# Patient Record
Sex: FEMALE | Race: OTHER | Employment: OTHER | URBAN - METROPOLITAN AREA
[De-identification: names, ages, dates, MRNs, and addresses within clinical notes are randomized per-mention and may not be internally consistent; named-entity substitution may affect disease eponyms.]

---

## 2017-01-09 ENCOUNTER — HOSPITAL ENCOUNTER (OUTPATIENT)
Dept: PHARMACY | Age: 77
Discharge: HOME | End: 2017-01-09
Payer: MEDICARE

## 2017-01-09 DIAGNOSIS — I48.91 ATRIAL FIBRILLATION, UNSPECIFIED TYPE (HCC): ICD-10-CM

## 2017-01-09 LAB
INR BLD: 2.6
PROTIME: 30.6 SECONDS

## 2017-01-09 PROCEDURE — 85610 PROTHROMBIN TIME: CPT | Performed by: PHARMACIST

## 2017-01-09 PROCEDURE — G0463 HOSPITAL OUTPT CLINIC VISIT: HCPCS | Performed by: PHARMACIST

## 2017-01-30 ENCOUNTER — HOSPITAL ENCOUNTER (OUTPATIENT)
Dept: PHARMACY | Age: 77
Discharge: HOME | End: 2017-01-30
Payer: MEDICARE

## 2017-01-30 DIAGNOSIS — I48.91 ATRIAL FIBRILLATION, UNSPECIFIED TYPE (HCC): ICD-10-CM

## 2017-01-30 LAB
INR BLD: 2.1
PROTIME: 25.4 SECONDS

## 2017-01-30 PROCEDURE — G0463 HOSPITAL OUTPT CLINIC VISIT: HCPCS | Performed by: PHARMACIST

## 2017-01-30 PROCEDURE — 85610 PROTHROMBIN TIME: CPT | Performed by: PHARMACIST

## 2017-02-24 ENCOUNTER — HOSPITAL ENCOUNTER (OUTPATIENT)
Dept: CARDIOLOGY | Age: 77
Discharge: HOME | End: 2017-02-24
Payer: MEDICARE

## 2017-02-24 PROCEDURE — 93279 PRGRMG DEV EVAL PM/LDLS PM: CPT

## 2017-02-27 ENCOUNTER — HOSPITAL ENCOUNTER (OUTPATIENT)
Dept: PHARMACY | Age: 77
Discharge: HOME | End: 2017-02-27
Payer: MEDICARE

## 2017-02-27 DIAGNOSIS — I48.91 ATRIAL FIBRILLATION, UNSPECIFIED TYPE (HCC): ICD-10-CM

## 2017-02-27 LAB
INR BLD: 1.6
PROTIME: 19.1 SECONDS

## 2017-02-27 PROCEDURE — G0463 HOSPITAL OUTPT CLINIC VISIT: HCPCS

## 2017-02-27 PROCEDURE — 85610 PROTHROMBIN TIME: CPT

## 2017-03-13 ENCOUNTER — HOSPITAL ENCOUNTER (OUTPATIENT)
Dept: PHARMACY | Age: 77
Discharge: HOME | End: 2017-03-13
Payer: MEDICARE

## 2017-03-13 DIAGNOSIS — I48.91 ATRIAL FIBRILLATION, UNSPECIFIED TYPE (HCC): ICD-10-CM

## 2017-03-13 LAB
INR BLD: 2.3
PROTIME: 27.2 SECONDS

## 2017-03-13 PROCEDURE — 85610 PROTHROMBIN TIME: CPT | Performed by: PHARMACIST

## 2017-03-13 PROCEDURE — G0463 HOSPITAL OUTPT CLINIC VISIT: HCPCS | Performed by: PHARMACIST

## 2017-03-13 RX ORDER — NITROGLYCERIN 0.4 MG/1
0.4 TABLET SUBLINGUAL PRN
COMMUNITY

## 2017-03-13 RX ORDER — SERTRALINE HYDROCHLORIDE 100 MG/1
200 TABLET, FILM COATED ORAL DAILY
COMMUNITY
Start: 2017-01-31

## 2017-03-13 RX ORDER — FUROSEMIDE 20 MG/1
20 TABLET ORAL DAILY
COMMUNITY
Start: 2016-01-11

## 2017-03-13 RX ORDER — POTASSIUM CHLORIDE 20 MEQ/1
20 TABLET, EXTENDED RELEASE ORAL DAILY
COMMUNITY
Start: 2016-11-25

## 2017-03-13 RX ORDER — HYDROCODONE BITARTRATE AND ACETAMINOPHEN 5; 325 MG/1; MG/1
2 TABLET ORAL EVERY 4 HOURS PRN
COMMUNITY
Start: 2016-11-18 | End: 2019-09-16 | Stop reason: ALTCHOICE

## 2017-03-13 RX ORDER — VALSARTAN 40 MG/1
40 TABLET ORAL DAILY
COMMUNITY
Start: 2017-02-13

## 2017-03-13 RX ORDER — ASPIRIN 81 MG/1
81 TABLET ORAL DAILY
COMMUNITY
Start: 2016-07-08

## 2017-03-13 RX ORDER — ISOSORBIDE MONONITRATE 30 MG/1
30 TABLET, EXTENDED RELEASE ORAL DAILY
COMMUNITY
Start: 2016-01-11

## 2017-03-13 RX ORDER — LANOLIN ALCOHOL/MO/W.PET/CERES
2 CREAM (GRAM) TOPICAL DAILY
COMMUNITY
Start: 2011-03-17

## 2017-03-13 RX ORDER — OMEPRAZOLE 20 MG/1
20 TABLET, DELAYED RELEASE ORAL DAILY
COMMUNITY
Start: 2012-07-06

## 2017-03-13 RX ORDER — LEVOTHYROXINE SODIUM 137 UG/1
137 TABLET ORAL DAILY
COMMUNITY
Start: 2017-02-15

## 2017-03-13 RX ORDER — HYDROCHLOROTHIAZIDE 12.5 MG/1
12.5 CAPSULE, GELATIN COATED ORAL DAILY
COMMUNITY
Start: 2017-02-13

## 2017-03-13 RX ORDER — FERROUS SULFATE 325(65) MG
1 TABLET ORAL 2 TIMES DAILY
COMMUNITY
Start: 2016-06-29

## 2017-03-13 RX ORDER — ATORVASTATIN CALCIUM 40 MG/1
40 TABLET, FILM COATED ORAL NIGHTLY
COMMUNITY
Start: 2015-04-14

## 2017-03-13 RX ORDER — ALBUTEROL SULFATE 90 UG/1
2 AEROSOL, METERED RESPIRATORY (INHALATION) 4 TIMES DAILY
COMMUNITY

## 2017-04-03 ENCOUNTER — HOSPITAL ENCOUNTER (OUTPATIENT)
Dept: PHARMACY | Age: 77
Discharge: HOME | End: 2017-04-03
Payer: MEDICARE

## 2017-04-03 DIAGNOSIS — I48.91 ATRIAL FIBRILLATION, UNSPECIFIED TYPE (HCC): ICD-10-CM

## 2017-04-03 LAB
INR BLD: 1.9
PROTIME: 22.3 SECONDS

## 2017-04-03 PROCEDURE — 85610 PROTHROMBIN TIME: CPT

## 2017-04-03 PROCEDURE — G0463 HOSPITAL OUTPT CLINIC VISIT: HCPCS

## 2017-04-03 RX ORDER — WARFARIN SODIUM 2 MG/1
TABLET ORAL
Qty: 150 TABLET | Refills: 1 | Status: SHIPPED | OUTPATIENT
Start: 2017-04-03 | End: 2019-09-16

## 2017-05-03 ENCOUNTER — ANTI-COAG VISIT (OUTPATIENT)
Dept: PHARMACY | Age: 77
End: 2017-05-03

## 2017-05-03 DIAGNOSIS — I48.91 ATRIAL FIBRILLATION, UNSPECIFIED TYPE (HCC): ICD-10-CM

## 2017-05-30 ENCOUNTER — HOSPITAL ENCOUNTER (OUTPATIENT)
Dept: CARDIOLOGY | Age: 77
Discharge: HOME | End: 2017-05-30
Payer: MEDICARE

## 2017-05-30 PROCEDURE — 93293 PM PHONE R-STRIP DEVICE EVAL: CPT

## 2017-08-29 ENCOUNTER — HOSPITAL ENCOUNTER (OUTPATIENT)
Dept: CARDIOLOGY | Age: 77
Discharge: HOME | End: 2017-08-29
Payer: MEDICARE

## 2017-08-29 PROCEDURE — 93293 PM PHONE R-STRIP DEVICE EVAL: CPT

## 2017-11-30 ENCOUNTER — HOSPITAL ENCOUNTER (OUTPATIENT)
Dept: CARDIOLOGY | Age: 77
Discharge: HOME | End: 2017-11-30
Payer: MEDICARE

## 2017-11-30 PROCEDURE — 93293 PM PHONE R-STRIP DEVICE EVAL: CPT

## 2017-12-21 LAB
ANION GAP SERPL CALCULATED.3IONS-SCNC: 15 MEQ/L (ref 7–13)
BUN BLDV-MCNC: 23 MG/DL (ref 8–23)
CALCIUM SERPL-MCNC: 9.1 MG/DL (ref 8.6–10.2)
CHLORIDE BLD-SCNC: 102 MEQ/L (ref 98–107)
CO2: 28 MEQ/L (ref 22–29)
CREAT SERPL-MCNC: 0.91 MG/DL (ref 0.5–0.9)
GFR AFRICAN AMERICAN: >60
GFR NON-AFRICAN AMERICAN: 59.5
GLUCOSE BLD-MCNC: 82 MG/DL (ref 74–109)
POTASSIUM SERPL-SCNC: 4 MEQ/L (ref 3.5–5.1)
SODIUM BLD-SCNC: 145 MEQ/L (ref 132–144)

## 2017-12-29 ENCOUNTER — HOSPITAL ENCOUNTER (OUTPATIENT)
Dept: CT IMAGING | Age: 77
Discharge: HOME | End: 2017-12-29
Payer: MEDICARE

## 2017-12-29 VITALS
HEIGHT: 59 IN | DIASTOLIC BLOOD PRESSURE: 65 MMHG | WEIGHT: 170 LBS | BODY MASS INDEX: 34.27 KG/M2 | SYSTOLIC BLOOD PRESSURE: 138 MMHG

## 2017-12-29 DIAGNOSIS — R22.2 MASS IN CHEST: ICD-10-CM

## 2017-12-29 PROCEDURE — 73201 CT UPPER EXTREMITY W/DYE: CPT

## 2017-12-29 PROCEDURE — 6360000004 HC RX CONTRAST MEDICATION: Performed by: INTERNAL MEDICINE

## 2017-12-29 PROCEDURE — 71260 CT THORAX DX C+: CPT

## 2017-12-29 PROCEDURE — 2580000003 HC RX 258: Performed by: INTERNAL MEDICINE

## 2017-12-29 RX ORDER — SODIUM CHLORIDE 0.9 % (FLUSH) 0.9 %
10 SYRINGE (ML) INJECTION PRN
Status: DISCONTINUED | OUTPATIENT
Start: 2017-12-29 | End: 2018-01-01 | Stop reason: HOSPADM

## 2017-12-29 RX ADMIN — SODIUM CHLORIDE, PRESERVATIVE FREE 10 ML: 5 INJECTION INTRAVENOUS at 16:37

## 2017-12-29 RX ADMIN — IOPAMIDOL 75 ML: 612 INJECTION, SOLUTION INTRAVENOUS at 16:36

## 2018-03-05 ENCOUNTER — HOSPITAL ENCOUNTER (OUTPATIENT)
Dept: CARDIOLOGY | Age: 78
Discharge: HOME | End: 2018-03-05
Payer: MEDICARE

## 2018-03-05 PROCEDURE — 93279 PRGRMG DEV EVAL PM/LDLS PM: CPT

## 2018-03-28 ENCOUNTER — HOSPITAL ENCOUNTER (OUTPATIENT)
Dept: GENERAL RADIOLOGY | Age: 78
Discharge: HOME | End: 2018-03-30
Payer: MEDICARE

## 2018-03-28 DIAGNOSIS — M54.6 THORACIC BACK PAIN, UNSPECIFIED BACK PAIN LATERALITY, UNSPECIFIED CHRONICITY: ICD-10-CM

## 2018-03-28 DIAGNOSIS — M54.89 OTHER BACK PAIN, UNSPECIFIED CHRONICITY: ICD-10-CM

## 2018-03-28 PROCEDURE — 72114 X-RAY EXAM L-S SPINE BENDING: CPT

## 2018-03-28 PROCEDURE — 72072 X-RAY EXAM THORAC SPINE 3VWS: CPT

## 2018-06-05 ENCOUNTER — HOSPITAL ENCOUNTER (OUTPATIENT)
Dept: CARDIOLOGY | Age: 78
Discharge: HOME | End: 2018-06-05
Payer: MEDICARE

## 2018-06-05 PROCEDURE — 93293 PM PHONE R-STRIP DEVICE EVAL: CPT

## 2018-06-21 ENCOUNTER — HOSPITAL ENCOUNTER (EMERGENCY)
Age: 78
Discharge: HOME | End: 2018-06-21
Payer: MEDICARE

## 2018-06-21 ENCOUNTER — APPOINTMENT (OUTPATIENT)
Dept: CT IMAGING | Age: 78
End: 2018-06-21
Payer: MEDICARE

## 2018-06-21 VITALS
OXYGEN SATURATION: 100 % | BODY MASS INDEX: 32.25 KG/M2 | HEIGHT: 59 IN | TEMPERATURE: 98.5 F | WEIGHT: 160 LBS | DIASTOLIC BLOOD PRESSURE: 72 MMHG | SYSTOLIC BLOOD PRESSURE: 128 MMHG | HEART RATE: 78 BPM | RESPIRATION RATE: 18 BRPM

## 2018-06-21 DIAGNOSIS — S00.83XA FACIAL HEMATOMA, INITIAL ENCOUNTER: ICD-10-CM

## 2018-06-21 DIAGNOSIS — S09.90XA CLOSED HEAD INJURY, INITIAL ENCOUNTER: Primary | ICD-10-CM

## 2018-06-21 PROCEDURE — 99283 EMERGENCY DEPT VISIT LOW MDM: CPT

## 2018-06-21 PROCEDURE — 70450 CT HEAD/BRAIN W/O DYE: CPT

## 2018-06-21 PROCEDURE — 6370000000 HC RX 637 (ALT 250 FOR IP): Performed by: PHYSICIAN ASSISTANT

## 2018-06-21 PROCEDURE — 72125 CT NECK SPINE W/O DYE: CPT

## 2018-06-21 RX ORDER — ACETAMINOPHEN 325 MG/1
325-650 TABLET ORAL EVERY 6 HOURS PRN
Qty: 40 TABLET | Refills: 0 | Status: SHIPPED | OUTPATIENT
Start: 2018-06-21

## 2018-06-21 RX ORDER — ACETAMINOPHEN 325 MG/1
650 TABLET ORAL ONCE
Status: COMPLETED | OUTPATIENT
Start: 2018-06-21 | End: 2018-06-21

## 2018-06-21 RX ADMIN — ACETAMINOPHEN 650 MG: 325 TABLET ORAL at 15:40

## 2018-06-21 ASSESSMENT — ENCOUNTER SYMPTOMS
COLOR CHANGE: 0
EYE PAIN: 0
TROUBLE SWALLOWING: 0
APNEA: 0
ABDOMINAL PAIN: 0
SHORTNESS OF BREATH: 0
ALLERGIC/IMMUNOLOGIC NEGATIVE: 1

## 2018-06-21 ASSESSMENT — PAIN SCALES - GENERAL: PAINLEVEL_OUTOF10: 2

## 2018-09-04 ENCOUNTER — HOSPITAL ENCOUNTER (OUTPATIENT)
Dept: CARDIOLOGY | Age: 78
Discharge: HOME | End: 2018-09-04
Payer: MEDICARE

## 2018-09-04 PROCEDURE — 93293 PM PHONE R-STRIP DEVICE EVAL: CPT

## 2018-12-06 ENCOUNTER — HOSPITAL ENCOUNTER (OUTPATIENT)
Dept: CARDIOLOGY | Age: 78
Discharge: HOME | End: 2018-12-06
Payer: MEDICARE

## 2018-12-06 PROCEDURE — 93293 PM PHONE R-STRIP DEVICE EVAL: CPT

## 2019-03-07 ENCOUNTER — HOSPITAL ENCOUNTER (OUTPATIENT)
Dept: CARDIOLOGY | Age: 79
Discharge: HOME | End: 2019-03-07
Payer: MEDICARE

## 2019-03-07 PROCEDURE — 93293 PM PHONE R-STRIP DEVICE EVAL: CPT

## 2019-09-05 LAB
ALBUMIN SERPL-MCNC: 3.9 G/DL (ref 3.5–4.6)
ALP BLD-CCNC: 114 U/L (ref 40–130)
ALT SERPL-CCNC: 12 U/L (ref 0–33)
ANION GAP SERPL CALCULATED.3IONS-SCNC: 14 MEQ/L (ref 9–15)
AST SERPL-CCNC: 17 U/L (ref 0–35)
BILIRUB SERPL-MCNC: 0.5 MG/DL (ref 0.2–0.7)
BUN BLDV-MCNC: 23 MG/DL (ref 8–23)
CALCIUM SERPL-MCNC: 9.4 MG/DL (ref 8.5–9.9)
CHLORIDE BLD-SCNC: 106 MEQ/L (ref 95–107)
CHOLESTEROL, TOTAL: 169 MG/DL (ref 0–199)
CO2: 25 MEQ/L (ref 20–31)
CREAT SERPL-MCNC: 1.05 MG/DL (ref 0.5–0.9)
GFR AFRICAN AMERICAN: >60
GFR NON-AFRICAN AMERICAN: 50.2
GLOBULIN: 3.2 G/DL (ref 2.3–3.5)
GLUCOSE BLD-MCNC: 100 MG/DL (ref 70–99)
HCT VFR BLD CALC: 33.5 % (ref 37–47)
HDLC SERPL-MCNC: 72 MG/DL (ref 40–59)
HEMOGLOBIN: 11 G/DL (ref 12–16)
LDL CHOLESTEROL CALCULATED: 80 MG/DL (ref 0–129)
MCH RBC QN AUTO: 29.6 PG (ref 27–31.3)
MCHC RBC AUTO-ENTMCNC: 33 % (ref 33–37)
MCV RBC AUTO: 89.8 FL (ref 82–100)
PDW BLD-RTO: 14.5 % (ref 11.5–14.5)
PLATELET # BLD: 195 K/UL (ref 130–400)
POTASSIUM SERPL-SCNC: 3.4 MEQ/L (ref 3.4–4.9)
RBC # BLD: 3.73 M/UL (ref 4.2–5.4)
SODIUM BLD-SCNC: 145 MEQ/L (ref 135–144)
TOTAL PROTEIN: 7.1 G/DL (ref 6.3–8)
TRIGL SERPL-MCNC: 84 MG/DL (ref 0–150)
TSH SERPL DL<=0.05 MIU/L-ACNC: 0.12 UIU/ML (ref 0.44–3.86)
WBC # BLD: 6.6 K/UL (ref 4.8–10.8)

## 2019-09-10 ENCOUNTER — HOSPITAL ENCOUNTER (OUTPATIENT)
Dept: CARDIOLOGY | Age: 79
Discharge: HOME | End: 2019-09-10
Payer: MEDICARE

## 2019-09-10 PROCEDURE — 93279 PRGRMG DEV EVAL PM/LDLS PM: CPT

## 2019-09-16 ENCOUNTER — OFFICE VISIT (OUTPATIENT)
Dept: FAMILY MEDICINE CLINIC | Age: 79
End: 2019-09-16
Payer: MEDICARE

## 2019-09-16 VITALS
HEIGHT: 59 IN | WEIGHT: 165.2 LBS | BODY MASS INDEX: 33.3 KG/M2 | RESPIRATION RATE: 12 BRPM | HEART RATE: 86 BPM | TEMPERATURE: 98.8 F | SYSTOLIC BLOOD PRESSURE: 130 MMHG | OXYGEN SATURATION: 95 % | DIASTOLIC BLOOD PRESSURE: 60 MMHG

## 2019-09-16 DIAGNOSIS — L21.9 SEBORRHEIC DERMATITIS OF SCALP: Primary | ICD-10-CM

## 2019-09-16 PROBLEM — N18.30 CKD (CHRONIC KIDNEY DISEASE) STAGE 3, GFR 30-59 ML/MIN (HCC): Status: ACTIVE | Noted: 2017-09-07

## 2019-09-16 PROBLEM — M51.36 DDD (DEGENERATIVE DISC DISEASE), LUMBAR: Status: ACTIVE | Noted: 2019-07-24

## 2019-09-16 PROBLEM — G89.29 CHRONIC LEFT-SIDED LOW BACK PAIN WITH LEFT-SIDED SCIATICA: Status: ACTIVE | Noted: 2019-07-24

## 2019-09-16 PROBLEM — R29.898 WEAKNESS OF LEFT LOWER EXTREMITY: Status: ACTIVE | Noted: 2019-07-24

## 2019-09-16 PROBLEM — G89.4 CHRONIC PAIN DISORDER: Status: ACTIVE | Noted: 2018-01-23

## 2019-09-16 PROBLEM — M54.16 RADICULOPATHY, LUMBAR REGION: Status: ACTIVE | Noted: 2019-07-24

## 2019-09-16 PROBLEM — E78.5 HYPERLIPIDEMIA: Status: ACTIVE | Noted: 2019-09-16

## 2019-09-16 PROBLEM — M16.12 ARTHRITIS OF LEFT HIP: Status: ACTIVE | Noted: 2018-02-07

## 2019-09-16 PROBLEM — I25.810 CORONARY ATHEROSCLEROSIS OF AUTOLOGOUS VEIN BYPASS GRAFT: Status: ACTIVE | Noted: 2019-09-16

## 2019-09-16 PROBLEM — M54.42 CHRONIC LEFT-SIDED LOW BACK PAIN WITH LEFT-SIDED SCIATICA: Status: ACTIVE | Noted: 2019-07-24

## 2019-09-16 PROCEDURE — 1090F PRES/ABSN URINE INCON ASSESS: CPT | Performed by: NURSE PRACTITIONER

## 2019-09-16 PROCEDURE — 1123F ACP DISCUSS/DSCN MKR DOCD: CPT | Performed by: NURSE PRACTITIONER

## 2019-09-16 PROCEDURE — G8427 DOCREV CUR MEDS BY ELIG CLIN: HCPCS | Performed by: NURSE PRACTITIONER

## 2019-09-16 PROCEDURE — G8400 PT W/DXA NO RESULTS DOC: HCPCS | Performed by: NURSE PRACTITIONER

## 2019-09-16 PROCEDURE — G8417 CALC BMI ABV UP PARAM F/U: HCPCS | Performed by: NURSE PRACTITIONER

## 2019-09-16 PROCEDURE — 4040F PNEUMOC VAC/ADMIN/RCVD: CPT | Performed by: NURSE PRACTITIONER

## 2019-09-16 PROCEDURE — 99213 OFFICE O/P EST LOW 20 MIN: CPT | Performed by: NURSE PRACTITIONER

## 2019-09-16 PROCEDURE — G8598 ASA/ANTIPLAT THER USED: HCPCS | Performed by: NURSE PRACTITIONER

## 2019-09-16 PROCEDURE — 4004F PT TOBACCO SCREEN RCVD TLK: CPT | Performed by: NURSE PRACTITIONER

## 2019-09-16 RX ORDER — CICLOPIROX 1 G/100ML
SHAMPOO TOPICAL
Qty: 1 BOTTLE | Refills: 0 | Status: SHIPPED | OUTPATIENT
Start: 2019-09-16

## 2019-09-16 RX ORDER — CLOBETASOL PROPIONATE 0.5 MG/G
OINTMENT TOPICAL
Qty: 45 G | Refills: 0 | Status: SHIPPED | OUTPATIENT
Start: 2019-09-16 | End: 2019-09-30

## 2019-09-16 RX ORDER — APIXABAN 5 MG/1
TABLET, FILM COATED ORAL
COMMUNITY
Start: 2019-07-01

## 2019-09-16 NOTE — PATIENT INSTRUCTIONS
broken or infected skin. Also avoid using this medicine in open wounds. Wash your hands before and after using clobetasol topical, unless you are using the medicine to treat the skin on your hands. Use the smallest amount that is effective in treating your condition. Do not use this medicine for longer than prescribed. Do not cover the treated skin area unless your doctor tells you to. Covering treated areas can increase the amount of medicine absorbed through your skin and may cause harmful effects. If you are treating the diaper area, do not use plastic pants or tight-fitting diapers. Call your doctor if your symptoms do not improve after 2 weeks of treatment, or if you have signs of a skin infection. If you use this medicine long-term, you may need frequent medical tests. If you need surgery, tell your surgeon you currently use this medicine. Store at room temperature away from moisture and heat. Keep from freezing. Clobetasol foam is flammable. Do not use near high heat or open flame. Do not smoke until the foam has completely dried on your skin. What happens if I miss a dose? Use the medicine as soon as you can, but skip the missed dose if it is almost time for your next dose. Do not use two doses at one time. What happens if I overdose? Seek emergency medical attention or call the Poison Help line at 1-422.895.2125. An overdose of clobetasol topical is not expected to produce life threatening symptoms. Long term use of high doses can lead to thinning skin, easy bruising, changes in body fat (especially in your face, neck, back, and waist), increased acne or facial hair, menstrual problems, impotence, or loss of interest in sex. What should I avoid while using clobetasol topical?  Do not use this medicine to treat any condition that has not been checked by your doctor. Avoid using clobetasol topical to treat skin on your face, underarms, or groin area without your doctor's advice.   Do not get

## 2019-09-29 ASSESSMENT — ENCOUNTER SYMPTOMS
SHORTNESS OF BREATH: 0
TROUBLE SWALLOWING: 0
DIARRHEA: 0
VOMITING: 0
ABDOMINAL PAIN: 0
COUGH: 0
SORE THROAT: 0
EYE REDNESS: 0

## 2019-12-10 ENCOUNTER — HOSPITAL ENCOUNTER (OUTPATIENT)
Dept: CARDIOLOGY | Age: 79
Discharge: HOME | End: 2019-12-10
Payer: MEDICARE

## 2019-12-10 PROCEDURE — 93293 PM PHONE R-STRIP DEVICE EVAL: CPT

## 2020-04-20 ENCOUNTER — HOSPITAL ENCOUNTER (OUTPATIENT)
Dept: CARDIOLOGY | Age: 80
Discharge: HOME | End: 2020-04-20
Payer: MEDICARE

## 2020-04-20 PROCEDURE — 93296 REM INTERROG EVL PM/IDS: CPT

## 2020-07-20 ENCOUNTER — HOSPITAL ENCOUNTER (OUTPATIENT)
Dept: CARDIOLOGY | Age: 80
Discharge: HOME | End: 2020-07-20
Payer: MEDICARE

## 2020-07-20 PROCEDURE — 93296 REM INTERROG EVL PM/IDS: CPT

## 2020-10-22 ENCOUNTER — HOSPITAL ENCOUNTER (OUTPATIENT)
Dept: CARDIOLOGY | Age: 80
Discharge: HOME | End: 2020-10-22
Payer: MEDICARE

## 2020-10-22 PROCEDURE — 93296 REM INTERROG EVL PM/IDS: CPT

## 2021-02-01 ENCOUNTER — HOSPITAL ENCOUNTER (OUTPATIENT)
Dept: CARDIOLOGY | Age: 81
Discharge: HOME | End: 2021-02-01
Payer: MEDICARE

## 2021-02-01 PROCEDURE — 93296 REM INTERROG EVL PM/IDS: CPT

## 2021-04-26 LAB
ALBUMIN SERPL-MCNC: 4.6 G/DL (ref 3.5–4.6)
ALP BLD-CCNC: 124 U/L (ref 40–130)
ALT SERPL-CCNC: 13 U/L (ref 0–33)
ANION GAP SERPL CALCULATED.3IONS-SCNC: 14 MEQ/L (ref 9–15)
AST SERPL-CCNC: 24 U/L (ref 0–35)
BILIRUB SERPL-MCNC: 0.3 MG/DL (ref 0.2–0.7)
BUN BLDV-MCNC: 25 MG/DL (ref 8–23)
CALCIUM SERPL-MCNC: 9.7 MG/DL (ref 8.5–9.9)
CHLORIDE BLD-SCNC: 104 MEQ/L (ref 95–107)
CHOLESTEROL, TOTAL: 183 MG/DL (ref 0–199)
CO2: 28 MEQ/L (ref 20–31)
CREAT SERPL-MCNC: 1.08 MG/DL (ref 0.5–0.9)
GFR AFRICAN AMERICAN: 58.6
GFR NON-AFRICAN AMERICAN: 48.4
GLOBULIN: 2.8 G/DL (ref 2.3–3.5)
GLUCOSE BLD-MCNC: 103 MG/DL (ref 70–99)
HCT VFR BLD CALC: 34.6 % (ref 37–47)
HDLC SERPL-MCNC: 73 MG/DL (ref 40–59)
HEMOGLOBIN: 11.5 G/DL (ref 12–16)
LDL CHOLESTEROL CALCULATED: 87 MG/DL (ref 0–129)
MCH RBC QN AUTO: 29.6 PG (ref 27–31.3)
MCHC RBC AUTO-ENTMCNC: 33.2 % (ref 33–37)
MCV RBC AUTO: 89.1 FL (ref 82–100)
PDW BLD-RTO: 13.7 % (ref 11.5–14.5)
PLATELET # BLD: 193 K/UL (ref 130–400)
POTASSIUM SERPL-SCNC: 3.9 MEQ/L (ref 3.4–4.9)
RBC # BLD: 3.89 M/UL (ref 4.2–5.4)
SODIUM BLD-SCNC: 146 MEQ/L (ref 135–144)
TOTAL PROTEIN: 7.4 G/DL (ref 6.3–8)
TRIGL SERPL-MCNC: 113 MG/DL (ref 0–150)
TSH SERPL DL<=0.05 MIU/L-ACNC: 4.4 UIU/ML (ref 0.44–3.86)
WBC # BLD: 6.1 K/UL (ref 4.8–10.8)

## 2021-05-04 ENCOUNTER — HOSPITAL ENCOUNTER (OUTPATIENT)
Dept: CARDIOLOGY | Age: 81
Discharge: HOME | End: 2021-05-04
Payer: MEDICARE

## 2021-05-04 PROCEDURE — 93296 REM INTERROG EVL PM/IDS: CPT

## 2021-07-12 ENCOUNTER — HOSPITAL ENCOUNTER (OUTPATIENT)
Dept: GENERAL RADIOLOGY | Age: 81
Discharge: HOME | End: 2021-07-14
Payer: MEDICARE

## 2021-07-12 DIAGNOSIS — R52 PAIN: ICD-10-CM

## 2021-07-12 PROCEDURE — 73564 X-RAY EXAM KNEE 4 OR MORE: CPT

## 2021-08-06 ENCOUNTER — HOSPITAL ENCOUNTER (OUTPATIENT)
Dept: CARDIOLOGY | Age: 81
Discharge: HOME | End: 2021-08-06
Payer: MEDICARE

## 2021-08-06 PROCEDURE — 93296 REM INTERROG EVL PM/IDS: CPT

## 2021-11-08 ENCOUNTER — HOSPITAL ENCOUNTER (OUTPATIENT)
Dept: CARDIOLOGY | Age: 81
Discharge: HOME | End: 2021-11-08
Payer: MEDICARE

## 2021-11-08 PROCEDURE — 93296 REM INTERROG EVL PM/IDS: CPT

## 2022-02-07 ENCOUNTER — HOSPITAL ENCOUNTER (OUTPATIENT)
Dept: GENERAL RADIOLOGY | Age: 82
Discharge: HOME | End: 2022-02-09
Payer: MEDICARE

## 2022-02-07 DIAGNOSIS — M54.59 ACUTE MECHANICAL LOW BACK PAIN WITH DURATION OF LESS THAN SIX WEEKS: ICD-10-CM

## 2022-02-07 PROCEDURE — 72110 X-RAY EXAM L-2 SPINE 4/>VWS: CPT

## 2022-02-16 ENCOUNTER — HOSPITAL ENCOUNTER (OUTPATIENT)
Dept: CARDIOLOGY | Age: 82
Discharge: HOME | End: 2022-02-16
Payer: MEDICARE

## 2022-02-16 PROCEDURE — 93280 PM DEVICE PROGR EVAL DUAL: CPT

## 2022-05-18 ENCOUNTER — HOSPITAL ENCOUNTER (OUTPATIENT)
Dept: CARDIOLOGY | Age: 82
Discharge: HOME | End: 2022-05-18
Payer: MEDICARE

## 2022-05-18 PROCEDURE — 93296 REM INTERROG EVL PM/IDS: CPT

## 2022-08-24 ENCOUNTER — HOSPITAL ENCOUNTER (OUTPATIENT)
Dept: CARDIOLOGY | Age: 82
Discharge: HOME | End: 2022-08-24
Payer: MEDICARE

## 2022-08-24 PROCEDURE — 93296 REM INTERROG EVL PM/IDS: CPT

## 2022-11-23 ENCOUNTER — HOSPITAL ENCOUNTER (OUTPATIENT)
Dept: CARDIOLOGY | Age: 82
Discharge: HOME | End: 2022-11-23
Payer: MEDICARE

## 2022-11-23 PROCEDURE — 93296 REM INTERROG EVL PM/IDS: CPT

## 2023-02-22 ENCOUNTER — HOSPITAL ENCOUNTER (OUTPATIENT)
Dept: CARDIOLOGY | Age: 83
Discharge: HOME | End: 2023-02-22
Payer: MEDICARE

## 2023-02-22 PROCEDURE — 93296 REM INTERROG EVL PM/IDS: CPT

## 2023-06-02 ENCOUNTER — HOSPITAL ENCOUNTER (OUTPATIENT)
Dept: GENERAL RADIOLOGY | Age: 83
End: 2023-06-02
Payer: MEDICARE

## 2023-06-02 ENCOUNTER — TRANSCRIBE ORDERS (OUTPATIENT)
Dept: GENERAL RADIOLOGY | Age: 83
End: 2023-06-02

## 2023-06-02 DIAGNOSIS — M25.561 ARTHRALGIA OF BOTH LOWER LEGS: ICD-10-CM

## 2023-06-02 DIAGNOSIS — M25.562 ARTHRALGIA OF BOTH LOWER LEGS: Primary | ICD-10-CM

## 2023-06-02 DIAGNOSIS — M25.562 ARTHRALGIA OF BOTH LOWER LEGS: ICD-10-CM

## 2023-06-02 DIAGNOSIS — M25.561 ARTHRALGIA OF BOTH LOWER LEGS: Primary | ICD-10-CM

## 2023-06-02 PROCEDURE — 73560 X-RAY EXAM OF KNEE 1 OR 2: CPT

## 2023-07-10 ENCOUNTER — HOSPITAL ENCOUNTER (OUTPATIENT)
Dept: CARDIOLOGY | Age: 83
Discharge: HOME | End: 2023-07-10
Payer: MEDICARE

## 2023-07-10 PROCEDURE — 93296 REM INTERROG EVL PM/IDS: CPT

## 2023-10-04 DIAGNOSIS — I25.10 ARTERIOSCLEROTIC CORONARY ARTERY DISEASE: ICD-10-CM

## 2023-10-04 DIAGNOSIS — R07.9 CHEST PAIN, UNSPECIFIED TYPE: ICD-10-CM

## 2023-10-04 PROBLEM — I62.9 INTRACRANIAL BLEED (MULTI): Status: ACTIVE | Noted: 2023-10-04

## 2023-10-04 PROBLEM — Z98.62 S/P ANGIOPLASTY: Status: ACTIVE | Noted: 2023-10-04

## 2023-10-04 PROBLEM — R04.0 EPISTAXIS: Status: ACTIVE | Noted: 2023-10-04

## 2023-10-04 PROBLEM — I49.5 SSS (SICK SINUS SYNDROME) (MULTI): Status: ACTIVE | Noted: 2023-10-04

## 2023-10-04 PROBLEM — Z95.1 S/P CABG (CORONARY ARTERY BYPASS GRAFT): Status: ACTIVE | Noted: 2023-10-04

## 2023-10-04 PROBLEM — R06.02 SHORTNESS OF BREATH: Status: ACTIVE | Noted: 2023-10-04

## 2023-10-04 PROBLEM — Q28.2 AVM (ARTERIOVENOUS MALFORMATION) BRAIN (HHS-HCC): Status: ACTIVE | Noted: 2023-10-04

## 2023-10-04 PROBLEM — I10 ESSENTIAL HYPERTENSION: Status: ACTIVE | Noted: 2023-10-04

## 2023-10-04 PROBLEM — Z95.0 PRESENCE OF PERMANENT CARDIAC PACEMAKER: Status: ACTIVE | Noted: 2023-10-04

## 2023-10-04 PROBLEM — E03.9 HYPOTHYROIDISM: Status: ACTIVE | Noted: 2023-10-04

## 2023-10-04 PROBLEM — R09.89 BILATERAL CAROTID BRUITS: Status: ACTIVE | Noted: 2023-10-04

## 2023-10-04 PROBLEM — I35.1 AORTIC VALVE REGURGITATION, NONRHEUMATIC: Status: ACTIVE | Noted: 2023-10-04

## 2023-10-04 PROBLEM — E89.0 H/O PARTIAL THYROIDECTOMY: Status: ACTIVE | Noted: 2023-10-04

## 2023-10-04 PROBLEM — I48.0 PAROXYSMAL ATRIAL FIBRILLATION (MULTI): Status: ACTIVE | Noted: 2023-10-04

## 2023-10-04 PROBLEM — E78.5 HYPERLIPIDEMIA: Status: ACTIVE | Noted: 2023-10-04

## 2023-10-04 PROBLEM — I83.93 VARICOSE VEINS OF LEGS: Status: ACTIVE | Noted: 2023-10-04

## 2023-10-04 RX ORDER — ASPIRIN 81 MG/1
TABLET ORAL
COMMUNITY

## 2023-10-04 RX ORDER — OXYCODONE AND ACETAMINOPHEN 5; 325 MG/1; MG/1
1 TABLET ORAL 2 TIMES DAILY PRN
COMMUNITY
Start: 2023-04-18

## 2023-10-04 RX ORDER — FERROUS SULFATE 324(65)MG
1 TABLET, DELAYED RELEASE (ENTERIC COATED) ORAL DAILY
COMMUNITY

## 2023-10-04 RX ORDER — GABAPENTIN 300 MG/1
1 CAPSULE ORAL 3 TIMES DAILY
COMMUNITY
Start: 2021-02-09 | End: 2024-05-31 | Stop reason: WASHOUT

## 2023-10-04 RX ORDER — ATORVASTATIN CALCIUM 40 MG/1
1 TABLET, FILM COATED ORAL NIGHTLY
COMMUNITY
Start: 2020-11-09 | End: 2023-11-09 | Stop reason: SDUPTHER

## 2023-10-04 RX ORDER — APIXABAN 5 MG/1
1 TABLET, FILM COATED ORAL 2 TIMES DAILY
COMMUNITY
Start: 2021-02-04 | End: 2023-11-09 | Stop reason: SDUPTHER

## 2023-10-04 RX ORDER — VALSARTAN AND HYDROCHLOROTHIAZIDE 80; 12.5 MG/1; MG/1
1 TABLET, FILM COATED ORAL DAILY
COMMUNITY
Start: 2021-05-10 | End: 2023-11-09 | Stop reason: SDUPTHER

## 2023-10-04 RX ORDER — NITROGLYCERIN 0.4 MG/1
0.4 TABLET SUBLINGUAL EVERY 5 MIN PRN
COMMUNITY
Start: 2020-11-04 | End: 2023-10-04 | Stop reason: SDUPTHER

## 2023-10-04 RX ORDER — FUROSEMIDE 20 MG/1
1 TABLET ORAL DAILY PRN
COMMUNITY
Start: 2021-06-17 | End: 2023-11-09 | Stop reason: SDUPTHER

## 2023-10-04 RX ORDER — ISOSORBIDE MONONITRATE 30 MG/1
1 TABLET, EXTENDED RELEASE ORAL DAILY
COMMUNITY
Start: 2021-02-04 | End: 2024-02-12

## 2023-10-04 RX ORDER — OMEPRAZOLE 20 MG/1
1 CAPSULE, DELAYED RELEASE ORAL DAILY
COMMUNITY

## 2023-10-04 RX ORDER — POTASSIUM CHLORIDE 20 MEQ/1
20 TABLET, EXTENDED RELEASE ORAL DAILY PRN
COMMUNITY
Start: 2020-11-09 | End: 2023-11-09 | Stop reason: SDUPTHER

## 2023-10-04 RX ORDER — LEVOTHYROXINE SODIUM 137 UG/1
137 TABLET ORAL DAILY
COMMUNITY

## 2023-10-05 RX ORDER — NITROGLYCERIN 0.4 MG/1
0.4 TABLET SUBLINGUAL EVERY 5 MIN PRN
Qty: 25 TABLET | Refills: 5 | Status: SHIPPED | OUTPATIENT
Start: 2023-10-05 | End: 2023-11-09 | Stop reason: SDUPTHER

## 2023-10-09 ENCOUNTER — HOSPITAL ENCOUNTER (OUTPATIENT)
Dept: CARDIOLOGY | Age: 83
Discharge: HOME | End: 2023-10-09
Payer: MEDICARE

## 2023-10-09 PROCEDURE — 93296 REM INTERROG EVL PM/IDS: CPT

## 2023-11-09 ENCOUNTER — OFFICE VISIT (OUTPATIENT)
Dept: CARDIOLOGY | Facility: CLINIC | Age: 83
End: 2023-11-09
Payer: MEDICARE

## 2023-11-09 VITALS
HEART RATE: 75 BPM | SYSTOLIC BLOOD PRESSURE: 122 MMHG | DIASTOLIC BLOOD PRESSURE: 66 MMHG | HEIGHT: 59 IN | WEIGHT: 169 LBS | BODY MASS INDEX: 34.07 KG/M2

## 2023-11-09 DIAGNOSIS — I25.10 ARTERIOSCLEROTIC CORONARY ARTERY DISEASE: Primary | ICD-10-CM

## 2023-11-09 DIAGNOSIS — I48.0 PAROXYSMAL ATRIAL FIBRILLATION (MULTI): ICD-10-CM

## 2023-11-09 DIAGNOSIS — E87.6 HYPOKALEMIA: ICD-10-CM

## 2023-11-09 DIAGNOSIS — I10 ESSENTIAL HYPERTENSION: ICD-10-CM

## 2023-11-09 PROCEDURE — 3078F DIAST BP <80 MM HG: CPT | Performed by: INTERNAL MEDICINE

## 2023-11-09 PROCEDURE — 3074F SYST BP LT 130 MM HG: CPT | Performed by: INTERNAL MEDICINE

## 2023-11-09 PROCEDURE — 1159F MED LIST DOCD IN RCRD: CPT | Performed by: INTERNAL MEDICINE

## 2023-11-09 PROCEDURE — 99214 OFFICE O/P EST MOD 30 MIN: CPT | Performed by: INTERNAL MEDICINE

## 2023-11-09 PROCEDURE — 1036F TOBACCO NON-USER: CPT | Performed by: INTERNAL MEDICINE

## 2023-11-09 RX ORDER — FUROSEMIDE 20 MG/1
TABLET ORAL
Qty: 90 TABLET | Refills: 3 | Status: SHIPPED | OUTPATIENT
Start: 2023-11-09

## 2023-11-09 RX ORDER — APIXABAN 5 MG/1
5 TABLET, FILM COATED ORAL 2 TIMES DAILY
Qty: 180 TABLET | Refills: 1 | Status: SHIPPED | OUTPATIENT
Start: 2023-11-09 | End: 2024-05-06

## 2023-11-09 RX ORDER — POTASSIUM CHLORIDE 20 MEQ/1
20 TABLET, EXTENDED RELEASE ORAL DAILY PRN
Qty: 90 TABLET | Refills: 3 | Status: SHIPPED | OUTPATIENT
Start: 2023-11-09

## 2023-11-09 RX ORDER — VALSARTAN AND HYDROCHLOROTHIAZIDE 80; 12.5 MG/1; MG/1
1 TABLET, FILM COATED ORAL DAILY
Qty: 90 TABLET | Refills: 3 | Status: SHIPPED | OUTPATIENT
Start: 2023-11-09

## 2023-11-09 RX ORDER — NITROGLYCERIN 0.4 MG/1
0.4 TABLET SUBLINGUAL EVERY 5 MIN PRN
Qty: 25 TABLET | Refills: 5 | Status: SHIPPED | OUTPATIENT
Start: 2023-11-09 | End: 2024-05-30

## 2023-11-09 RX ORDER — ATORVASTATIN CALCIUM 40 MG/1
40 TABLET, FILM COATED ORAL NIGHTLY
Qty: 90 TABLET | Refills: 3 | Status: SHIPPED | OUTPATIENT
Start: 2023-11-09

## 2023-11-09 NOTE — PROGRESS NOTES
Patient:  Zhang Peters  YOB: 1940  MRN: [id number]       Impression/Plan:     Arteriosclerotic coronary artery disease  -     Quite reasonable functional capacity for age without angina.  She is now 8 years since bypass graft surgery with perfusion study 2 years ago unremarkable.  Continue current antiplatelet and statin medical regimen     Paroxysmal atrial fibrillation (CMS/HCC)/pacemaker  -     We will refer to [facility] pacemaker clinic  -      Low burden on pacemaker checks.  Continue anticoagulation.    essential hypertension  -    Continue current medications    Hypokalemia: This has not been a problem that was immediately postoperatively will repeat to assure that it is normal.  May have been related to fluid shifts around surgery.    Carotid artery disease: Moderate without progression continue statin      Chief Complaint/Active Symptoms:       Zhang Peters is a 83 y.o. female who presents with known coronary artery disease having had bypass graft surgery 2015.  Perfusion study December 2021 normal with EF 55%.  She has paroxysmal atrial fibrillation low burden with Maze procedure and left atrial appendage ligation March 2015.  Pacemaker had shown recurrent atrial fibrillation thereafter.  She has mild to moderate aortic insufficiency at echo 2021.  Carotid artery disease is present with carotid duplex May 2023 50-69% stenosis    Earlier this year underwent knee surgery uncomplicated from a cardiovascular standpoint    She denies angina, dyspnea, palpitation, edema, lightheadedness or syncope.  She has had no symptoms of claudication or neurologic deterioration.               Review of Systems: Unremarkable except as noted above    Meds     Current Outpatient Medications   Medication Instructions    aspirin 81 mg EC tablet Take 1 tablet Monday thru Friday only    atorvastatin (LIPITOR) 40 mg, oral, Nightly    Eliquis 5 mg, oral, 2 times daily    ferrous sulfate 324 mg (65 mg elemental iron) EC tablet  (delayed release) 1 tablet, oral, Daily    furosemide (Lasix) 20 mg tablet 1 tablet daily as needed    gabapentin (Neurontin) 300 mg capsule 1 capsule, oral, 3 times daily    isosorbide mononitrate ER (Imdur) 30 mg 24 hr tablet 1 tablet, oral, Daily    levothyroxine (SYNTHROID, LEVOXYL) 137 mcg, oral, Daily    multivitamin capsule 1 tablet, oral, Daily    nitroglycerin (NITROSTAT) 0.4 mg, sublingual, Every 5 min PRN    omeprazole (PriLOSEC) 20 mg DR capsule 1 capsule, oral, Daily    oxyCODONE-acetaminophen (Percocet) 5-325 mg tablet 1 tablet, oral, 2 times daily PRN    potassium chloride CR (Klor-Con M20) 20 mEq ER tablet 20 mEq, oral, Daily PRN    valsartan-hydrochlorothiazide (Diovan-HCT) 80-12.5 mg tablet 1 tablet, oral, Daily        Allergies     Allergies   Allergen Reactions    Amiodarone Unknown    Morphine Unknown         Annotated Problems     Specialty Problems          Cardiology Problems    Aortic valve regurgitation, nonrheumatic     · December 2021 echo: LVEF 50 %. Mild to moderate aortic insufficiency.          Arteriosclerotic coronary artery disease      · 12/7/2021 Lexiscan; no evidence of ischemia by perfusion imaging. LVEF 55%. Moderate      size anterior septal apical myocardial infarction      Cath 2/2015 with LM-NL; LAD occluded prox; CX/RCA diffuse dz but <30%, mild LV      dys;        3/17/2015 LIMA-LAD with MAZE; FARTUN ligation       1/13/04 adenosine: probable distal LAD distribution ischemia with normal LV systolic        function        1/14/04 cath: severe mid LAD 99%, LM normal, Cx trivial irregularities, RCA 10-15%        proximal, with PDA 50-60%, increased LVEDP, normal LV systolic function EF 55-60%        1/14/04 PCI prox LAD w/ 2.75 x 23 mm Cypher stent              Bilateral carotid bruits    Essential hypertension    Hyperlipidemia    Paroxysmal atrial fibrillation (CMS/HCC)    Presence of permanent cardiac pacemaker     4/30/2012 [employer] Adapta          S/P angioplasty    S/P  "CABG (coronary artery bypass graft)    SSS (sick sinus syndrome) (CMS/[facility])    Varicose veins of legs    Bilateral carotid artery disease (CMS/[facility])     May 2023 carotid duplex 50-69% bilateral stenosis             Problem List     Patient Active Problem List    Diagnosis Date Noted    Bilateral carotid artery disease (CMS/[facility]) 11/10/2023    Hypokalemia 11/09/2023    Aortic valve regurgitation, nonrheumatic 10/04/2023    Arteriosclerotic coronary artery disease 10/04/2023    AVM (arteriovenous malformation) brain 10/04/2023    Essential hypertension 10/04/2023    H/O partial thyroidectomy 10/04/2023    Hyperlipidemia 10/04/2023    Hypothyroidism 10/04/2023    Intracranial bleed (CMS/[facility]) 10/04/2023    Paroxysmal atrial fibrillation (CMS/[facility]) 10/04/2023    Presence of permanent cardiac pacemaker 10/04/2023    S/P angioplasty 10/04/2023    S/P CABG (coronary artery bypass graft) 10/04/2023    SSS (sick sinus syndrome) (CMS/[facility]) 10/04/2023    Varicose veins of legs 10/04/2023    Bilateral carotid bruits 10/04/2023       Objective:     Vitals:    11/09/23 1558   BP: 122/66   BP Location: Right arm   Patient Position: Sitting   Pulse: 75   Weight: 76.7 kg (169 lb)   Height: 1.499 m (4' 11\")      Wt Readings from Last 4 Encounters:   11/09/23 76.7 kg (169 lb)   05/09/23 80.3 kg (177 lb)   09/22/22 75.8 kg (167 lb)   06/14/22 76.2 kg (168 lb)           LAB:     Lab Results   Component Value Date    HGBA1C 6.9 (H) 07/25/2023       Diagnostic Studies:     Patient was never admitted.      Radiology:     No orders to display       Physical Exam     General Appearance: alert and oriented to person, place and time, in no acute distress  Cardiovascular: normal rate, regular rhythm, normal S1 and S2, no murmurs, rubs, clicks, or gallops,  no JVD  Pulmonary/Chest: clear to auscultation bilaterally- no wheezes, rales or rhonchi, normal air movement, no respiratory distress  Abdomen: soft, non-tender, non-distended, normal bowel " sounds, no masses   Extremities: no cyanosis, clubbing or edema  Skin: warm and dry, no rash or erythema  Eyes: EOMI  Neck: supple and non-tender without mass, no thyromegaly   Neurological: alert, oriented, normal speech, no focal findings or movement disorder noted

## 2023-11-10 PROBLEM — R04.0 EPISTAXIS: Status: RESOLVED | Noted: 2023-10-04 | Resolved: 2023-11-10

## 2023-11-10 PROBLEM — R07.9 CHEST PAIN: Status: RESOLVED | Noted: 2023-10-04 | Resolved: 2023-11-10

## 2023-11-10 PROBLEM — I77.9 BILATERAL CAROTID ARTERY DISEASE (CMS-HCC): Status: ACTIVE | Noted: 2023-11-10

## 2023-11-10 PROBLEM — R06.02 SHORTNESS OF BREATH: Status: RESOLVED | Noted: 2023-10-04 | Resolved: 2023-11-10

## 2023-11-17 DIAGNOSIS — Z95.0 PACEMAKER: Primary | ICD-10-CM

## 2023-11-17 DIAGNOSIS — I49.5 SICK SINUS SYNDROME (MULTI): ICD-10-CM

## 2023-11-27 ENCOUNTER — HOSPITAL ENCOUNTER (OUTPATIENT)
Dept: CARDIOLOGY | Facility: HOSPITAL | Age: 83
Discharge: HOME | End: 2023-11-27
Payer: MEDICARE

## 2023-11-27 DIAGNOSIS — Z95.0 PACEMAKER: ICD-10-CM

## 2023-11-27 DIAGNOSIS — I48.0 PAROXYSMAL ATRIAL FIBRILLATION (MULTI): ICD-10-CM

## 2023-11-27 DIAGNOSIS — I49.5 SICK SINUS SYNDROME (MULTI): ICD-10-CM

## 2023-11-27 PROCEDURE — 93279 PRGRMG DEV EVAL PM/LDLS PM: CPT

## 2023-11-27 PROCEDURE — 93279 PRGRMG DEV EVAL PM/LDLS PM: CPT | Performed by: INTERNAL MEDICINE

## 2023-11-29 ENCOUNTER — TELEPHONE (OUTPATIENT)
Dept: CARDIOLOGY | Facility: CLINIC | Age: 83
End: 2023-11-29
Payer: MEDICARE

## 2023-11-29 NOTE — TELEPHONE ENCOUNTER
----- Message from Arya Sibley MD sent at 11/29/2023 12:32 PM EST -----  Please call and tell her daughter that kidney function is stable potassium is normal continue cardiac medications.  ----- Message -----  From: Gale Ogden  Sent: 11/28/2023   8:34 AM EST  To: Arya Sibley MD

## 2024-03-05 ENCOUNTER — HOSPITAL ENCOUNTER (OUTPATIENT)
Dept: CARDIOLOGY | Facility: HOSPITAL | Age: 84
Discharge: HOME | End: 2024-03-05
Payer: MEDICARE

## 2024-03-05 DIAGNOSIS — I49.5 SINOATRIAL NODE DYSFUNCTION (MULTI): ICD-10-CM

## 2024-03-05 DIAGNOSIS — Z95.0 CARDIAC PACEMAKER IN SITU: ICD-10-CM

## 2024-03-05 DIAGNOSIS — Z95.0 CARDIAC PACEMAKER IN SITU: Primary | ICD-10-CM

## 2024-03-05 PROCEDURE — 93294 REM INTERROG EVL PM/LDLS PM: CPT | Performed by: INTERNAL MEDICINE

## 2024-03-05 PROCEDURE — 93296 REM INTERROG EVL PM/IDS: CPT

## 2024-05-09 ENCOUNTER — APPOINTMENT (OUTPATIENT)
Dept: CARDIOLOGY | Facility: CLINIC | Age: 84
End: 2024-05-09
Payer: MEDICARE

## 2024-05-30 ENCOUNTER — OFFICE VISIT (OUTPATIENT)
Dept: CARDIOLOGY | Facility: CLINIC | Age: 84
End: 2024-05-30
Payer: MEDICARE

## 2024-05-30 VITALS
WEIGHT: 151 LBS | HEIGHT: 59 IN | BODY MASS INDEX: 30.44 KG/M2 | SYSTOLIC BLOOD PRESSURE: 124 MMHG | HEART RATE: 74 BPM | DIASTOLIC BLOOD PRESSURE: 62 MMHG

## 2024-05-30 DIAGNOSIS — I25.10 ARTERIOSCLEROTIC CORONARY ARTERY DISEASE: ICD-10-CM

## 2024-05-30 DIAGNOSIS — Z95.0 PRESENCE OF PERMANENT CARDIAC PACEMAKER: ICD-10-CM

## 2024-05-30 DIAGNOSIS — I48.0 PAROXYSMAL ATRIAL FIBRILLATION (MULTI): ICD-10-CM

## 2024-05-30 DIAGNOSIS — I10 PRIMARY HYPERTENSION: ICD-10-CM

## 2024-05-30 PROCEDURE — 1159F MED LIST DOCD IN RCRD: CPT | Performed by: INTERNAL MEDICINE

## 2024-05-30 PROCEDURE — 99213 OFFICE O/P EST LOW 20 MIN: CPT | Performed by: INTERNAL MEDICINE

## 2024-05-30 PROCEDURE — 3074F SYST BP LT 130 MM HG: CPT | Performed by: INTERNAL MEDICINE

## 2024-05-30 PROCEDURE — 3078F DIAST BP <80 MM HG: CPT | Performed by: INTERNAL MEDICINE

## 2024-05-30 PROCEDURE — 1157F ADVNC CARE PLAN IN RCRD: CPT | Performed by: INTERNAL MEDICINE

## 2024-05-30 RX ORDER — ISOSORBIDE MONONITRATE 30 MG/1
30 TABLET, EXTENDED RELEASE ORAL DAILY
Qty: 90 TABLET | Refills: 3 | Status: SHIPPED | OUTPATIENT
Start: 2024-05-30

## 2024-05-30 RX ORDER — CELECOXIB 100 MG/1
100 CAPSULE ORAL 2 TIMES DAILY
COMMUNITY

## 2024-05-30 RX ORDER — APIXABAN 5 MG/1
5 TABLET, FILM COATED ORAL 2 TIMES DAILY
Qty: 180 TABLET | Refills: 3 | Status: SHIPPED | OUTPATIENT
Start: 2024-05-30

## 2024-05-30 NOTE — PATIENT INSTRUCTIONS
-Please bring all medicines, vitamins, and herbal supplements with you in original bottles to every appointment!!!!    -Prescriptions will not be filled unless you are compliant with your follow up appointments or have a follow up appointment scheduled as per instruction of your physician. Refills should be requested at the time of your visit.

## 2024-05-30 NOTE — PROGRESS NOTES
Patient:  Zhang Peters  YOB: 1940  MRN: [id number]       Impression/Plan:     Diagnoses and all orders for this visit:  Paroxysmal atrial fibrillation (Multi)  -     [place] low burden on pacemaker checks continue Eliquis no bleeding  Arteriosclerotic coronary artery disease  -     No angina reasonable functional capacity at 84 years old would not put her through surveillance testing unless she were to have angina encouraging her cholesterol is under excellent control as his blood pressure which will blunt progression of disease  Primary hypertension  -   [place] without side effects of current medication as can be seen laboratories are quite stable as well  Presence of permanent cardiac pacemaker  -    Has pacemaker follow-up      Chief Complaint/Active Symptoms:       Zhang Peters is a 84 y.o. female who presents with coronary artery disease having had bypass graft surgery 2015. Perfusion study December 2021 normal with EF 55%. She has paroxysmal atrial fibrillation low burden with Maze procedure and left atrial appendage ligation March 2015. Pacemaker had shown recurrent atrial fibrillation thereafter. She has mild to moderate aortic insufficiency at echo 2021. Carotid artery disease is present with carotid duplex May 2023 50-69% stenosis .      I last saw her 11/9/2023 at which time no evidence of angina or CHF.  Pacemaker functioning well referred to  pacemaker clinic at that time blood pressure well-controlled carotid disease without progression specific pathology identified with unremarkable CT scan and lab work.    She denies angina, dyspnea, palpitation, edema, lightheadedness or syncope.  She has had no symptoms of claudication or neurologic deterioration.  There have been no hospitalizations or emergency room visits since last office visit.    She has been very well.  Earlier this year she was in the emergency room with confusion and found to have a significant UTI.  Once that was treated she  returned to her normal self.  She has had no cardiovascular symptoms.    Recent pacemaker check stable    Lab work 4/30/2024 cholesterol 155 triglycerides 176 HDL 63 LDL 57 CMP remarkable only for creatinine 1.14 sodium 145 hemoglobin A1c 5.9 hemoglobin stable at 10.5 this is chronic thyroid normal               Review of Systems: Unremarkable except as noted above    Meds     Current Outpatient Medications   Medication Instructions    aspirin 81 mg EC tablet Take 1 tablet Monday thru Friday only    atorvastatin (LIPITOR) 40 mg, oral, Nightly    celecoxib (CELEBREX) 100 mg, oral, 2 times daily    Eliquis 5 mg, oral, 2 times daily    ferrous sulfate 324 mg (65 mg elemental iron) EC tablet (delayed release) 1 tablet, oral, Daily    furosemide (Lasix) 20 mg tablet 1 tablet daily as needed    gabapentin (Neurontin) 300 mg capsule 1 capsule, oral, 3 times daily    isosorbide mononitrate ER (IMDUR) 30 mg, oral, Daily    levothyroxine (SYNTHROID, LEVOXYL) 137 mcg, oral, Daily    multivitamin capsule 1 tablet, oral, Daily    nitroglycerin (NITROSTAT) 0.4 mg, sublingual, Every 5 min PRN    omeprazole (PriLOSEC) 20 mg DR capsule 1 capsule, oral, Daily    oxyCODONE-acetaminophen (Percocet) 5-325 mg tablet 1 tablet, oral, 2 times daily PRN    potassium chloride CR (Klor-Con M20) 20 mEq ER tablet 20 mEq, oral, Daily PRN    valsartan-hydrochlorothiazide (Diovan-HCT) 80-12.5 mg tablet 1 tablet, oral, Daily        Allergies     Allergies   Allergen Reactions    Amiodarone Unknown    Morphine Unknown         Annotated Problems     Specialty Problems          Cardiology Problems    Aortic valve regurgitation, nonrheumatic     · December 2021 echo: LVEF 50 %. Mild to moderate aortic insufficiency.          Arteriosclerotic coronary artery disease      · 12/7/2021 Lexiscan; no evidence of ischemia by perfusion imaging. LVEF 55%. Moderate      size anterior septal apical myocardial infarction      Cath 2/2015 with LM-NL; LAD occluded  "prox; CX/RCA diffuse dz but <30%, mild LV      dys;        3/17/2015 LIMA-LAD with MAZE; FARTUN ligation       1/13/04 adenosine: probable distal LAD distribution ischemia with normal LV systolic        function        1/14/04 cath: severe mid LAD 99%, LM normal, Cx trivial irregularities, RCA 10-15%        proximal, with PDA 50-60%, increased LVEDP, normal LV systolic function EF 55-60%        1/14/04 PCI prox LAD w/ 2.75 x 23 mm Cypher stent              Bilateral carotid bruits    Essential hypertension    Hyperlipidemia    Paroxysmal atrial fibrillation (Multi)    Presence of permanent cardiac pacemaker     4/30/2012 [employer] Adapta          S/P angioplasty    S/P CABG (coronary artery bypass graft)    SSS (sick sinus syndrome) (Multi)    Varicose veins of legs    Bilateral carotid artery disease (CMS-HCC)     May 2023 carotid duplex 50-69% bilateral stenosis             Problem List     Patient Active Problem List    Diagnosis Date Noted    Bilateral carotid artery disease (CMS-HCC) 11/10/2023    Hypokalemia 11/09/2023    Aortic valve regurgitation, nonrheumatic 10/04/2023    Arteriosclerotic coronary artery disease 10/04/2023    AVM (arteriovenous malformation) brain ([facility]) 10/04/2023    Essential hypertension 10/04/2023    H/O partial thyroidectomy 10/04/2023    Hyperlipidemia 10/04/2023    Hypothyroidism 10/04/2023    Intracranial bleed (Multi) 10/04/2023    Paroxysmal atrial fibrillation (Multi) 10/04/2023    Presence of permanent cardiac pacemaker 10/04/2023    S/P angioplasty 10/04/2023    S/P CABG (coronary artery bypass graft) 10/04/2023    SSS (sick sinus syndrome) (Multi) 10/04/2023    Varicose veins of legs 10/04/2023    Bilateral carotid bruits 10/04/2023       Objective:     Vitals:    05/30/24 1413   BP: 124/62   BP Location: Right arm   Patient Position: Sitting   Pulse: 74   Weight: 68.5 kg (151 lb)   Height: 1.499 m (4' 11\")      Wt Readings from Last 4 Encounters:   05/30/24 68.5 kg (151 lb) "   11/09/23 76.7 kg (169 lb)   05/09/23 80.3 kg (177 lb)   09/22/22 75.8 kg (167 lb)           LAB:     Lab Results   Component Value Date    HGBA1C 5.9 (H) 04/30/2024       Diagnostic Studies:     No results found.      Radiology:     No orders to display       Physical Exam     General Appearance: alert and oriented to person, place and time, in no acute distress  Cardiovascular: normal rate, regular rhythm, normal S1 and S2, no murmurs, rubs, clicks, or gallops,  no JVD  Pulmonary/Chest: clear to auscultation bilaterally- no wheezes, rales or rhonchi, normal air movement, no respiratory distress  Abdomen: soft, non-tender, non-distended, normal bowel sounds, no masses   Extremities: no cyanosis, clubbing or edema  Skin: warm and dry, no rash or erythema  Eyes: EOMI  Neck: supple and non-tender without mass, no thyromegaly   Neurological: alert, oriented, normal speech, no focal findings or movement disorder noted  Vascular:               Scribe Attestation  By signing my name below, IThao LPN , Scribe   attest that this documentation has been prepared under the direction and in the presence of Arya Sibley MD.

## 2024-06-13 ENCOUNTER — HOSPITAL ENCOUNTER (OUTPATIENT)
Dept: CARDIOLOGY | Facility: HOSPITAL | Age: 84
Discharge: HOME | End: 2024-06-13
Payer: MEDICARE

## 2024-06-13 DIAGNOSIS — I49.5 SINOATRIAL NODE DYSFUNCTION (MULTI): ICD-10-CM

## 2024-06-13 DIAGNOSIS — Z95.0 CARDIAC PACEMAKER IN SITU: ICD-10-CM

## 2024-06-13 PROCEDURE — 93296 REM INTERROG EVL PM/IDS: CPT

## 2024-06-13 PROCEDURE — 93294 REM INTERROG EVL PM/LDLS PM: CPT | Performed by: INTERNAL MEDICINE

## 2024-08-12 DIAGNOSIS — E87.6 HYPOKALEMIA: ICD-10-CM

## 2024-08-12 RX ORDER — POTASSIUM CHLORIDE 20 MEQ/1
20 TABLET, EXTENDED RELEASE ORAL DAILY PRN
Qty: 90 TABLET | Refills: 3 | Status: SHIPPED | OUTPATIENT
Start: 2024-08-12

## 2024-09-26 ENCOUNTER — HOSPITAL ENCOUNTER (OUTPATIENT)
Dept: CARDIOLOGY | Facility: HOSPITAL | Age: 84
Discharge: HOME | End: 2024-09-26
Payer: COMMERCIAL

## 2024-09-26 DIAGNOSIS — Z95.0 CARDIAC PACEMAKER IN SITU: ICD-10-CM

## 2024-09-26 DIAGNOSIS — I49.5 SINOATRIAL NODE DYSFUNCTION (MULTI): ICD-10-CM

## 2024-09-26 PROCEDURE — 93294 REM INTERROG EVL PM/LDLS PM: CPT | Performed by: INTERNAL MEDICINE

## 2024-09-26 PROCEDURE — 93296 REM INTERROG EVL PM/IDS: CPT

## 2024-11-26 ENCOUNTER — APPOINTMENT (OUTPATIENT)
Dept: CARDIOLOGY | Facility: CLINIC | Age: 84
End: 2024-11-26
Payer: MEDICARE

## 2024-11-26 VITALS
SYSTOLIC BLOOD PRESSURE: 112 MMHG | WEIGHT: 150.6 LBS | HEIGHT: 59 IN | BODY MASS INDEX: 30.36 KG/M2 | DIASTOLIC BLOOD PRESSURE: 60 MMHG | HEART RATE: 71 BPM

## 2024-11-26 DIAGNOSIS — E78.2 MIXED HYPERLIPIDEMIA: ICD-10-CM

## 2024-11-26 DIAGNOSIS — Z95.0 PRESENCE OF PERMANENT CARDIAC PACEMAKER: ICD-10-CM

## 2024-11-26 DIAGNOSIS — I48.0 PAROXYSMAL ATRIAL FIBRILLATION (MULTI): ICD-10-CM

## 2024-11-26 DIAGNOSIS — D64.9 ANEMIA, UNSPECIFIED TYPE: ICD-10-CM

## 2024-11-26 DIAGNOSIS — I25.10 ARTERIOSCLEROTIC CORONARY ARTERY DISEASE: Primary | ICD-10-CM

## 2024-11-26 PROCEDURE — 3078F DIAST BP <80 MM HG: CPT | Performed by: INTERNAL MEDICINE

## 2024-11-26 PROCEDURE — 1157F ADVNC CARE PLAN IN RCRD: CPT | Performed by: INTERNAL MEDICINE

## 2024-11-26 PROCEDURE — 99214 OFFICE O/P EST MOD 30 MIN: CPT | Performed by: INTERNAL MEDICINE

## 2024-11-26 PROCEDURE — G2211 COMPLEX E/M VISIT ADD ON: HCPCS | Performed by: INTERNAL MEDICINE

## 2024-11-26 PROCEDURE — 1159F MED LIST DOCD IN RCRD: CPT | Performed by: INTERNAL MEDICINE

## 2024-11-26 PROCEDURE — 3074F SYST BP LT 130 MM HG: CPT | Performed by: INTERNAL MEDICINE

## 2024-11-26 PROCEDURE — 1036F TOBACCO NON-USER: CPT | Performed by: INTERNAL MEDICINE

## 2024-11-26 NOTE — PROGRESS NOTES
Patient:  Zhang Peters  YOB: 1940  MRN: [id number]       Impression/Plan:     Diagnoses and all orders for this visit:  Arteriosclerotic coronary artery disease  -    Reasonable functional capacity at 86 years old.  No angina or CHF at this time.  She had left internal mammary artery to the LAD as her bypass some years ago.  Her cholesterol has been well-controlled.  No evidence to suggest progressive disease  -     At age 86 on full dose Eliquis I think she would be best served by Eliquis alone as opposed to aspirin plus Eliquis.  Eliquis has been shown also to reduce coronary events I think bleeding risk at her age outweighs benefit of antiplatelet plus Eliquis and a very stable coronary artery environment.  Paroxysmal atrial fibrillation (Multi)  -    Very low burden on pacemaker checks continue anticoagulation  Mixed hyperlipidemia  -     Tolerating statin well laboratory studies to be obtained to assure cholesterol is reasonably controlled as well as no adverse effect from a liver standpoint  Anemia, unspecified type  -     CBC; Future  -     Followed by medicine service has not been checked in over 6 months will obtain CBC to assure no decrease in hemoglobin  Presence of permanent cardiac pacemaker  -     Cardiac Device Check - In Clinic; Future  -   September study shows excellent device function.  Would like her to have an in person evaluation at least once yearly  -     Follow Up In Cardiology; Future      Chief Complaint/Active Symptoms:       Zhang Peters is a 84 y.o. female who presents with  coronary artery disease having had bypass graft surgery 2015. Perfusion study December 2021 normal with EF 55%. She has paroxysmal atrial fibrillation low burden with Maze procedure and left atrial appendage ligation March 2015. Pacemaker had shown recurrent atrial fibrillation thereafter. She has mild to moderate aortic insufficiency at echo 2021. Carotid artery disease is present with carotid duplex May  2023 50-69% stenosis .      I last saw her 5/30/2024 at which time she was doing extremely well pacemaker check showing minimal atrial fibrillation no bleeding on anticoagulation no angina and blood pressure controlled    Pacemaker is assessed through [facility] remote assessment on 9/26/2024 normal device function 2 episodes of increased ventricular rates for 3 to 4 seconds the vast majority the time sinus rhythm ventricular sensing minimal pacing.     Battery life 3 years        Last lab work June hemoglobin 9.7 CMP unremarkable except creatinine 1.16    She denies angina, dyspnea, palpitation, edema, lightheadedness or syncope.  She has had no symptoms of claudication or neurologic deterioration.  There have been no hospitalizations or emergency room visits since last office visit.    Her only complaint is knee pain    Review of Systems: Unremarkable except as noted above    Meds     Current Outpatient Medications   Medication Instructions    aspirin 81 mg EC tablet Take 1 tablet Monday thru Friday only    atorvastatin (LIPITOR) 40 mg, oral, Nightly    celecoxib (CELEBREX) 100 mg, oral, 2 times daily    Eliquis 5 mg, oral, 2 times daily    ferrous sulfate 324 mg (65 mg elemental iron) EC tablet (delayed release) 1 tablet, Daily    furosemide (Lasix) 20 mg tablet 1 tablet daily as needed    isosorbide mononitrate ER (IMDUR) 30 mg, oral, Daily    levothyroxine (SYNTHROID, LEVOXYL) 137 mcg, Daily    multivitamin capsule 1 tablet, Daily    nitroglycerin (NITROSTAT) 0.4 mg, sublingual, Every 5 min PRN    omeprazole (PriLOSEC) 20 mg DR capsule 1 capsule, Daily    oxyCODONE-acetaminophen (Percocet) 5-325 mg tablet 1 tablet, 2 times daily PRN    potassium chloride CR (Klor-Con M20) 20 mEq ER tablet 20 mEq, oral, Daily PRN    valsartan-hydrochlorothiazide (Diovan-HCT) 80-12.5 mg tablet 1 tablet, oral, Daily        Allergies     Allergies   Allergen Reactions    Amiodarone Unknown    Morphine  Unknown         Annotated Problems     Specialty Problems          Cardiology Problems    Aortic valve regurgitation, nonrheumatic     · December 2021 echo: LVEF 50 %. Mild to moderate aortic insufficiency.          Arteriosclerotic coronary artery disease      · 12/7/2021 Lexiscan; no evidence of ischemia by perfusion imaging. LVEF 55%. Moderate      size anterior septal apical myocardial infarction      Cath 2/2015 with LM-NL; LAD occluded prox; CX/RCA diffuse dz but <30%, mild LV      dys;        3/17/2015 LIMA-LAD with MAZE; FARTUN ligation       1/13/04 adenosine: probable distal LAD distribution ischemia with normal LV systolic        function        1/14/04 cath: severe mid LAD 99%, LM normal, Cx trivial irregularities, RCA 10-15%        proximal, with PDA 50-60%, increased LVEDP, normal LV systolic function EF 55-60%        1/14/04 PCI prox LAD w/ 2.75 x 23 mm Cypher stent              Bilateral carotid bruits    Essential hypertension    Hyperlipidemia    Paroxysmal atrial fibrillation (Multi)    Presence of permanent cardiac pacemaker     4/30/2012 [employer]tronic Adapta          S/P angioplasty    S/P CABG (coronary artery bypass graft)    SSS (sick sinus syndrome) (Multi)    Varicose veins of legs    Bilateral carotid artery disease (CMS-HCC)     May 2023 carotid duplex 50-69% bilateral stenosis             Problem List     Patient Active Problem List    Diagnosis Date Noted    Bilateral carotid artery disease (CMS-HCC) 11/10/2023    Hypokalemia 11/09/2023    Aortic valve regurgitation, nonrheumatic 10/04/2023    Arteriosclerotic coronary artery disease 10/04/2023    AVM (arteriovenous malformation) brain ([facility]) 10/04/2023    Essential hypertension 10/04/2023    H/O partial thyroidectomy 10/04/2023    Hyperlipidemia 10/04/2023    Hypothyroidism 10/04/2023    Intracranial bleed (Multi) 10/04/2023    Paroxysmal atrial fibrillation (Multi) 10/04/2023    Presence of permanent cardiac pacemaker 10/04/2023    S/P  "angioplasty 10/04/2023    S/P CABG (coronary artery bypass graft) 10/04/2023    SSS (sick sinus syndrome) (Multi) 10/04/2023    Varicose veins of legs 10/04/2023    Bilateral carotid bruits 10/04/2023       Objective:     Vitals:    11/26/24 1609   BP: 112/60   BP Location: Left arm   Patient Position: Sitting   Pulse: 71   Weight: 68.3 kg (150 lb 9.6 oz)   Height: 1.499 m (4' 11\")      Wt Readings from Last 4 Encounters:   11/26/24 68.3 kg (150 lb 9.6 oz)   05/30/24 68.5 kg (151 lb)   11/09/23 76.7 kg (169 lb)   05/09/23 80.3 kg (177 lb)           LAB:     Lab Results   Component Value Date    HGBA1C 5.9 (H) 04/30/2024       Diagnostic Studies:     No results found.      Radiology:     No orders to display       Physical Exam     General Appearance: alert and oriented to person, place and time, in no acute distress  Cardiovascular: normal rate, regular rhythm, normal S1 and S2, no murmurs, rubs, clicks, or gallops,  no JVD  Pulmonary/Chest: clear to auscultation bilaterally- no wheezes, rales or rhonchi, normal air movement, no respiratory distress  Abdomen: soft, non-tender, non-distended, normal bowel sounds, no masses   Extremities: no cyanosis, clubbing or edema  Skin: warm and dry, no rash or erythema  Eyes: EOMI  Neck: supple and non-tender without mass, no thyromegaly   Neurological: alert, oriented, normal speech, no focal findings or movement disorder noted                    "

## 2024-12-11 ENCOUNTER — LAB (OUTPATIENT)
Dept: LAB | Facility: LAB | Age: 84
End: 2024-12-11
Payer: MEDICARE

## 2024-12-11 DIAGNOSIS — Z95.0 PRESENCE OF PERMANENT CARDIAC PACEMAKER: ICD-10-CM

## 2024-12-11 DIAGNOSIS — E78.2 MIXED HYPERLIPIDEMIA: ICD-10-CM

## 2024-12-11 DIAGNOSIS — I48.0 PAROXYSMAL ATRIAL FIBRILLATION (MULTI): ICD-10-CM

## 2024-12-11 DIAGNOSIS — I25.10 ARTERIOSCLEROTIC CORONARY ARTERY DISEASE: ICD-10-CM

## 2024-12-11 DIAGNOSIS — D64.9 ANEMIA, UNSPECIFIED TYPE: ICD-10-CM

## 2024-12-11 LAB
ANION GAP SERPL CALC-SCNC: 13 MMOL/L (ref 10–20)
BUN SERPL-MCNC: 26 MG/DL (ref 6–23)
CALCIUM SERPL-MCNC: 9.2 MG/DL (ref 8.6–10.3)
CHLORIDE SERPL-SCNC: 105 MMOL/L (ref 98–107)
CHOLEST SERPL-MCNC: 192 MG/DL (ref 0–199)
CHOLESTEROL/HDL RATIO: 2.3
CO2 SERPL-SCNC: 29 MMOL/L (ref 21–32)
CREAT SERPL-MCNC: 1.09 MG/DL (ref 0.5–1.05)
EGFRCR SERPLBLD CKD-EPI 2021: 50 ML/MIN/1.73M*2
ERYTHROCYTE [DISTWIDTH] IN BLOOD BY AUTOMATED COUNT: 12.6 % (ref 11.5–14.5)
GLUCOSE SERPL-MCNC: 80 MG/DL (ref 74–99)
HCT VFR BLD AUTO: 33.5 % (ref 36–46)
HDLC SERPL-MCNC: 82.8 MG/DL
HGB BLD-MCNC: 10.7 G/DL (ref 12–16)
LDLC SERPL CALC-MCNC: 84 MG/DL
MCH RBC QN AUTO: 28.8 PG (ref 26–34)
MCHC RBC AUTO-ENTMCNC: 31.9 G/DL (ref 32–36)
MCV RBC AUTO: 90 FL (ref 80–100)
NON HDL CHOLESTEROL: 109 MG/DL (ref 0–149)
NRBC BLD-RTO: 0 /100 WBCS (ref 0–0)
PLATELET # BLD AUTO: 182 X10*3/UL (ref 150–450)
POTASSIUM SERPL-SCNC: 3.9 MMOL/L (ref 3.5–5.3)
RBC # BLD AUTO: 3.72 X10*6/UL (ref 4–5.2)
SODIUM SERPL-SCNC: 143 MMOL/L (ref 136–145)
TRIGL SERPL-MCNC: 128 MG/DL (ref 0–149)
VLDL: 26 MG/DL (ref 0–40)
WBC # BLD AUTO: 5.8 X10*3/UL (ref 4.4–11.3)

## 2024-12-11 PROCEDURE — 80061 LIPID PANEL: CPT

## 2024-12-11 PROCEDURE — 85027 COMPLETE CBC AUTOMATED: CPT

## 2024-12-11 PROCEDURE — 36415 COLL VENOUS BLD VENIPUNCTURE: CPT

## 2024-12-11 PROCEDURE — 80048 BASIC METABOLIC PNL TOTAL CA: CPT

## 2025-01-16 ENCOUNTER — APPOINTMENT (OUTPATIENT)
Dept: CARDIOLOGY | Facility: CLINIC | Age: 85
End: 2025-01-16
Payer: MEDICARE

## 2025-01-17 ENCOUNTER — HOSPITAL ENCOUNTER (OUTPATIENT)
Dept: CARDIOLOGY | Facility: HOSPITAL | Age: 85
Discharge: HOME | End: 2025-01-17
Payer: MEDICARE

## 2025-01-17 DIAGNOSIS — I49.5 SINOATRIAL NODE DYSFUNCTION (MULTI): ICD-10-CM

## 2025-01-17 DIAGNOSIS — Z95.0 CARDIAC PACEMAKER IN SITU: ICD-10-CM

## 2025-01-17 PROCEDURE — 93296 REM INTERROG EVL PM/IDS: CPT

## 2025-01-17 PROCEDURE — 93294 REM INTERROG EVL PM/LDLS PM: CPT | Performed by: INTERNAL MEDICINE

## 2025-03-20 ENCOUNTER — OFFICE VISIT (OUTPATIENT)
Dept: CARDIOLOGY | Facility: CLINIC | Age: 85
End: 2025-03-20
Payer: MEDICARE

## 2025-03-20 VITALS
BODY MASS INDEX: 32.01 KG/M2 | WEIGHT: 158.8 LBS | SYSTOLIC BLOOD PRESSURE: 136 MMHG | DIASTOLIC BLOOD PRESSURE: 60 MMHG | HEART RATE: 88 BPM | HEIGHT: 59 IN

## 2025-03-20 DIAGNOSIS — I25.10 ARTERIOSCLEROTIC CORONARY ARTERY DISEASE: ICD-10-CM

## 2025-03-20 DIAGNOSIS — F09 COGNITIVE DYSFUNCTION: ICD-10-CM

## 2025-03-20 DIAGNOSIS — I79.8 OTHER DISORDERS OF ARTERIES, ARTERIOLES AND CAPILLARIES IN DISEASES CLASSIFIED ELSEWHERE: ICD-10-CM

## 2025-03-20 DIAGNOSIS — R53.83 FATIGUE, UNSPECIFIED TYPE: ICD-10-CM

## 2025-03-20 PROCEDURE — 3078F DIAST BP <80 MM HG: CPT | Performed by: INTERNAL MEDICINE

## 2025-03-20 PROCEDURE — 1157F ADVNC CARE PLAN IN RCRD: CPT | Performed by: INTERNAL MEDICINE

## 2025-03-20 PROCEDURE — 99214 OFFICE O/P EST MOD 30 MIN: CPT | Performed by: INTERNAL MEDICINE

## 2025-03-20 PROCEDURE — 3075F SYST BP GE 130 - 139MM HG: CPT | Performed by: INTERNAL MEDICINE

## 2025-03-20 PROCEDURE — 1159F MED LIST DOCD IN RCRD: CPT | Performed by: INTERNAL MEDICINE

## 2025-03-20 PROCEDURE — G2211 COMPLEX E/M VISIT ADD ON: HCPCS | Performed by: INTERNAL MEDICINE

## 2025-03-20 RX ORDER — NITROGLYCERIN 0.4 MG/1
0.4 TABLET SUBLINGUAL EVERY 5 MIN PRN
Qty: 25 TABLET | Refills: 5 | Status: SHIPPED | OUTPATIENT
Start: 2025-03-20 | End: 2025-04-19

## 2025-03-20 NOTE — PROGRESS NOTES
Patient:  Zhang Peters  YOB: 1940  MRN: [id number]       Impression/Plan:     Diagnoses and all orders for this visit:  Fatigue, unspecified type  -     She does have coronary disease and paces much of the time would obtain echocardiogram to assure no decrease in overall LV function  -     Transthoracic Echo (TTE) Complete; Future  Arteriosclerotic coronary artery disease  -     nitroglycerin (Nitrostat) 0.4 mg SL tablet; Place 1 tablet (0.4 mg) under the tongue every 5 minutes if needed for chest pain (up to 3 doses as needed.  Call 911 if pain persists).  -     She has no angina perfusion study within the last 4 years was quite stable.  She had had mammary artery as part of her bypass I do not think it likely coronary disease has progressed  Cognitive dysfunction  Other disorders of arteries, arterioles and capillaries in diseases classified elsewhere  -    Would assess for any abnormalities evident in the carotid internal carotid system CT angiography suggested minimal if any disease.  -     Vascular US Carotid Artery Duplex Bilateral; Future  -    At 85 years old with vascular disease vascular dementia certainly possibility but this is a very unusual and that for the most part she recognizes everyone around her except intermittently the person she notes the best.  She does have follow-up with neurology.  She has been a longtime patient at [facility] and they have an excellent cognitive evaluation department.  -    On long-term albeit relatively low-dose narcotics and these may also be contributing to the symptoms        Chief Complaint/Active Symptoms:      Chief Complaint   Patient presents with    Fatigue    Altered Mental Status       Zhang Peters is a 85 y.o. female who presents with coronary artery disease having had bypass graft surgery 2015. Perfusion study December 2021 normal with EF 55%. She has paroxysmal atrial fibrillation low burden with Maze procedure and left atrial appendage  ligation March 2015. Pacemaker had shown recurrent atrial fibrillation thereafter. She has mild to moderate aortic insufficiency at echo 2021. Carotid artery disease is present with carotid duplex May 2023 50-69% stenosis .      I had last seen her 11/26/2024 which time she was asymptomatic.  No clinical symptoms to suggest atrial fibrillation or angina    3/19/2025 TSH 0.145 but T3-T4 normal.  Hemoglobin 10.6 platelet 196.  Hemoglobin unchanged comparison to December BMP remarkable for glucose 100 creatinine 0.99 liver functions unremarkable    Device check 1/16/2025 remote chest 13 high ventricular rate none lasting more than 6 seconds.  Minimal pacing less than 0.1%.    She denies angina, dyspnea, palpitation, edema, lightheadedness or syncope.  She has had no symptoms of claudication or neurologic deterioration.  There have been no hospitalizations or emergency room visits since last office visit.    He does not report any chest pain or shortness of breath but has less energy and tires out easily.  She has had no bleeding issues.  Her anemia has been stable.  Of concern is that she is intermittently not recognizing her daughter.  She has lived with her daughter for many years.  They do all sorts of things together they interact with many grandchildren.  The patient does not have trouble identifying her grandchildren evidently but sometimes will be looking at her daughter and does not know who it is.  He seems a bit confused around that time but then when her daughter goes and changes close she recognizes her again.  This is been going on for a little while.  She saw neurology who did not feel there was a specific cause of this behavior.  CT angiography of the brain showed no vascular disease and no abnormalities of brain parenchyma outside age-related findings.  At this time she is comfortable and appropriate               Review of Systems: Unremarkable except as noted above    Meds     Current Outpatient  Medications   Medication Instructions    aspirin 81 mg EC tablet Take 1 tablet Monday thru Friday only    atorvastatin (LIPITOR) 40 mg, oral, Nightly    Eliquis 5 mg, oral, 2 times daily    ferrous sulfate 324 mg (65 mg elemental iron) EC tablet (delayed release) 1 tablet, Daily    furosemide (Lasix) 20 mg tablet TAKE 1 TABLET BY MOUTH DAILY AS  NEEDED    isosorbide mononitrate ER (IMDUR) 30 mg, oral, Daily    levothyroxine (SYNTHROID, LEVOXYL) 137 mcg, Daily    multivitamin capsule 1 tablet, Daily    nitroglycerin (NITROSTAT) 0.4 mg, sublingual, Every 5 min PRN    omeprazole (PriLOSEC) 20 mg DR capsule 1 capsule, Daily    oxyCODONE-acetaminophen (Percocet) 5-325 mg tablet 1 tablet, 2 times daily PRN    potassium chloride CR (Klor-Con M20) 20 mEq ER tablet 20 mEq, oral, Daily PRN    valsartan-hydrochlorothiazide (Diovan-HCT) 80-12.5 mg tablet 1 tablet, oral, Daily        Allergies     Allergies   Allergen Reactions    Amiodarone Unknown    Morphine Unknown         Annotated Problems     Specialty Problems          Cardiology Problems    Aortic valve regurgitation, nonrheumatic     · December 2021 echo: LVEF 50 %. Mild to moderate aortic insufficiency.          Arteriosclerotic coronary artery disease      · 12/7/2021 Lexiscan; no evidence of ischemia by perfusion imaging. LVEF 55%. Moderate      size anterior septal apical myocardial infarction      Cath 2/2015 with LM-NL; LAD occluded prox; CX/RCA diffuse dz but <30%, mild LV      dys;        3/17/2015 LIMA-LAD with MAZE; FARTUN ligation       1/13/04 adenosine: probable distal LAD distribution ischemia with normal LV systolic        function        1/14/04 cath: severe mid LAD 99%, LM normal, Cx trivial irregularities, RCA 10-15%        proximal, with PDA 50-60%, increased LVEDP, normal LV systolic function EF 55-60%        1/14/04 PCI prox LAD w/ 2.75 x 23 mm Cypher stent              AVM [facility]arteriovenous malformation) [facility])     · 4/3/2014 Suboccipital  "craniotomy with resection of cerebellar AVM        1/10/2014 [id number] 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         Essential hypertension    Hyperlipidemia    Paroxysmal atrial fibrillation (Multi)    SSS (sick sinus syndrome) (Multi)    Varicose veins of legs    Bilateral carotid artery disease (CMS-HCC)     May 2023 carotid duplex 50-69% bilateral stenosis             Problem List     Patient Active Problem List    Diagnosis Date Noted    Anemia 11/26/2024    Bilateral carotid artery disease (CMS-HCC) 11/10/2023    Hypokalemia 11/09/2023    Aortic valve regurgitation, nonrheumatic 10/04/2023    Arteriosclerotic coronary artery disease 10/04/2023    AVM [facility]arteri[facility]formation[facility] ([facility]) 10/04/2023    Essential hypertension 10/04/2023    H/O partial thyroidectomy 10/04/2023    Hyperlipidemia 10/04/2023    Hypothyroidism 10/04/2023    Intracranial bleed (Multi) 10/04/2023    Paroxysmal atrial fibrillation (Multi) 10/04/2023    Presence of permanent cardiac pacemaker 10/04/2023    S/P angioplasty 10/04/2023    S/P CABG (coronary artery bypass graft) 10/04/2023    SSS (sick sinus syndrome) (Multi) 10/04/2023    Varicose veins of legs 10/04/2023    Bilateral carotid bruits 10/04/2023       Objective:     Vitals:    03/20/25 1321   BP: 136/60   BP Location: Left arm   Patient Position: Sitting   Pulse: 88   Weight: 72 kg (158 lb 12.8 oz)   Height: 1.499 m (4' 11\")      Wt Readings from Last 4 Encounters:   03/20/25 72 kg (158 lb 12.8 oz)   11/26/24 68.3 kg (150 lb 9.6 oz)   05/30/24 68.5 kg (151 lb)   11/09/23 76.7 kg (169 lb)           LAB:     Lab Results   Component Value Date    WBC 5.8 12/11/2024    HGB 10.7 (L) 12/11/2024    HCT 33.5 (L) 12/11/2024     12/11/2024    CHOL 192 12/11/2024    TRIG 128 12/11/2024    HDL 82.8 12/11/2024     12/11/2024    K 3.9 12/11/2024     12/11/2024    CREATININE 1.09 (H) 12/11/2024    BUN 26 (H) 12/11/2024    CO2 29 12/11/2024    HGBA1C 6 (H) " 12/31/2024         Physical Exam     General Appearance: alert and oriented to person, place and time, in no acute distress  Cardiovascular: normal rate, regular rhythm, normal S1 and S2, no murmurs, rubs, clicks, or gallops,  no JVD  Pulmonary/Chest: clear to auscultation bilaterally- no wheezes, rales or rhonchi, normal air movement, no respiratory distress  Abdomen: soft, non-tender, non-distended, normal bowel sounds, no masses   Extremities: no cyanosis, clubbing or edema  Skin: warm and dry, no rash or erythema  Eyes: EOMI  Neck: supple and non-tender without mass, no thyromegaly   Neurological: alert, oriented, normal speech, no focal findings or movement disorder noted  Vascular:       Provider attestation-scribe documentation  Any medical record entries made by the scribe were at my discretion and personally dictated by me.  I have reviewed the chart and agree that the record accurately reflects my personal performance of the history, physical exam, discussion and plan.        Scribe Attestation  By signing my name below, I, Vida Loza CMA   , Scribe   attest that this documentation has been prepared under the direction and in the presence of Arya Sibley MD.

## 2025-03-20 NOTE — PATIENT INSTRUCTIONS
STOP:  ISOSORBIDE  IF YOU HAVE CHEST PAIN THEN RESUME AND LET DR WARE KNOW YOU RESTARTED THE ISOSORBIDE    Please bring all medicines, vitamins, and herbal supplements with you in original bottles to every appointment! This is the best way to ensure your medication list in your chart is accurate.    Prescriptions will not be filled unless you are compliant with your follow up appointments or have a follow up appointment scheduled as per instruction of your physician. Refills should be requested at the time of your visit.    DID YOU KNOW  We have a pharmacy here in the [facility].  They can fill all prescriptions, not just cardiac medications.  Prescriptions from other pharmacies can easily be transferred to the  pharmacy by the  pharmacist on site.   pharmacies offer FREE HOME DELIVERY on medications to anywhere in [place]. They can sync your medications. Typically prescriptions can be ready in 10 - 15 minutes. If pharmacy is unable to fill your  prescription or if cost is more than your paying now the Pharmacist can easily transfer back to your Pharmacy of choice. Pharmacy phone # [phone].

## 2025-03-24 ENCOUNTER — HOSPITAL ENCOUNTER (OUTPATIENT)
Dept: CARDIOLOGY | Facility: CLINIC | Age: 85
Discharge: HOME | End: 2025-03-24
Payer: MEDICARE

## 2025-03-24 ENCOUNTER — APPOINTMENT (OUTPATIENT)
Dept: CARDIOLOGY | Facility: HOSPITAL | Age: 85
End: 2025-03-24
Payer: MEDICARE

## 2025-03-24 ENCOUNTER — HOSPITAL ENCOUNTER (OUTPATIENT)
Dept: VASCULAR MEDICINE | Facility: HOSPITAL | Age: 85
Discharge: HOME | End: 2025-03-24
Payer: MEDICARE

## 2025-03-24 DIAGNOSIS — F09 COGNITIVE DYSFUNCTION: ICD-10-CM

## 2025-03-24 DIAGNOSIS — I25.10 ARTERIOSCLEROTIC CORONARY ARTERY DISEASE: ICD-10-CM

## 2025-03-24 DIAGNOSIS — Z95.0 PACEMAKER: ICD-10-CM

## 2025-03-24 DIAGNOSIS — I49.5 SICK SINUS SYNDROME (MULTI): Primary | ICD-10-CM

## 2025-03-24 DIAGNOSIS — R53.83 FATIGUE, UNSPECIFIED TYPE: ICD-10-CM

## 2025-03-24 DIAGNOSIS — I79.8 OTHER DISORDERS OF ARTERIES, ARTERIOLES AND CAPILLARIES IN DISEASES CLASSIFIED ELSEWHERE: ICD-10-CM

## 2025-03-24 DIAGNOSIS — I48.0 PAROXYSMAL ATRIAL FIBRILLATION (MULTI): ICD-10-CM

## 2025-03-24 DIAGNOSIS — I49.5 SINOATRIAL NODE DYSFUNCTION (MULTI): ICD-10-CM

## 2025-03-24 LAB
AORTIC VALVE MEAN GRADIENT: 6 MMHG
AORTIC VALVE PEAK VELOCITY: 1.59 M/S
AV PEAK GRADIENT: 10 MMHG
AVA (PEAK VEL): 2.2 CM2
AVA (VTI): 2.36 CM2
EJECTION FRACTION APICAL 4 CHAMBER: 46.9
EJECTION FRACTION: 46 %
LEFT ATRIUM VOLUME AREA LENGTH INDEX BSA: 46 ML/M2
LEFT VENTRICLE INTERNAL DIMENSION DIASTOLE: 5.24 CM (ref 3.5–6)
LEFT VENTRICULAR OUTFLOW TRACT DIAMETER: 2.24 CM
MITRAL VALVE E/A RATIO: 0.84
RIGHT VENTRICLE FREE WALL PEAK S': 9 CM/S
RIGHT VENTRICLE PEAK SYSTOLIC PRESSURE: 27.8 MMHG
TRICUSPID ANNULAR PLANE SYSTOLIC EXCURSION: 2 CM

## 2025-03-24 PROCEDURE — C8929 TTE W OR WO FOL WCON,DOPPLER: HCPCS

## 2025-03-24 PROCEDURE — 93306 TTE W/DOPPLER COMPLETE: CPT | Performed by: INTERNAL MEDICINE

## 2025-03-24 PROCEDURE — 2500000004 HC RX 250 GENERAL PHARMACY W/ HCPCS (ALT 636 FOR OP/ED): Performed by: INTERNAL MEDICINE

## 2025-03-24 PROCEDURE — 93880 EXTRACRANIAL BILAT STUDY: CPT

## 2025-03-24 PROCEDURE — 93880 EXTRACRANIAL BILAT STUDY: CPT | Performed by: STUDENT IN AN ORGANIZED HEALTH CARE EDUCATION/TRAINING PROGRAM

## 2025-03-24 RX ADMIN — PERFLUTREN 5 ML OF DILUTION: 6.52 INJECTION, SUSPENSION INTRAVENOUS at 13:45

## 2025-03-26 ENCOUNTER — TELEPHONE (OUTPATIENT)
Dept: CARDIOLOGY | Facility: CLINIC | Age: 85
End: 2025-03-26
Payer: MEDICARE

## 2025-03-26 ENCOUNTER — APPOINTMENT (OUTPATIENT)
Dept: CARDIOLOGY | Facility: CLINIC | Age: 85
End: 2025-03-26
Payer: MEDICARE

## 2025-03-26 NOTE — TELEPHONE ENCOUNTER
Patient's daughter Gregory returned call to office and was notified regarding Echo results.  She is asking for results of Carotid Ultrasound.  Vida Loza, CMA

## 2025-03-26 NOTE — TELEPHONE ENCOUNTER
----- Message from Arya Sibley sent at 3/25/2025  5:39 PM EDT -----  Please call and tell her daughter that the echo shows no significant change from previously that would explain her symptoms.  ----- Message -----  From: Jarad Syngo - Cardiology Results In  Sent: 3/24/2025   4:17 PM EDT  To: Arya Sibley MD

## 2025-03-26 NOTE — TELEPHONE ENCOUNTER
RN tried to call patients daughter, Gregory, regarding patients test results, however, she was unavailable.  RN left  for return telephone call.      Isabel You RN

## 2025-04-22 ENCOUNTER — APPOINTMENT (OUTPATIENT)
Dept: CARDIOLOGY | Facility: CLINIC | Age: 85
End: 2025-04-22
Payer: MEDICARE

## 2025-05-08 ENCOUNTER — APPOINTMENT (OUTPATIENT)
Dept: CARDIOLOGY | Facility: HOSPITAL | Age: 85
End: 2025-05-08
Payer: MEDICARE

## 2025-05-22 ENCOUNTER — APPOINTMENT (OUTPATIENT)
Dept: CARDIOLOGY | Facility: CLINIC | Age: 85
End: 2025-05-22
Payer: MEDICARE

## 2025-05-27 ENCOUNTER — APPOINTMENT (OUTPATIENT)
Dept: CARDIOLOGY | Facility: CLINIC | Age: 85
End: 2025-05-27
Payer: MEDICARE

## 2025-06-12 ENCOUNTER — HOSPITAL ENCOUNTER (OUTPATIENT)
Dept: CARDIOLOGY | Facility: HOSPITAL | Age: 85
Discharge: HOME | End: 2025-06-12
Payer: MEDICARE

## 2025-06-12 DIAGNOSIS — Z95.0 PRESENCE OF PERMANENT CARDIAC PACEMAKER: ICD-10-CM

## 2025-06-12 PROCEDURE — 93280 PM DEVICE PROGR EVAL DUAL: CPT | Performed by: INTERNAL MEDICINE

## 2025-06-12 PROCEDURE — 93280 PM DEVICE PROGR EVAL DUAL: CPT

## 2025-07-15 ENCOUNTER — APPOINTMENT (OUTPATIENT)
Dept: CARDIOLOGY | Facility: CLINIC | Age: 85
End: 2025-07-15
Payer: MEDICARE

## 2025-08-12 DIAGNOSIS — I10 ESSENTIAL HYPERTENSION: ICD-10-CM

## 2025-08-13 RX ORDER — VALSARTAN AND HYDROCHLOROTHIAZIDE 80; 12.5 MG/1; MG/1
1 TABLET, FILM COATED ORAL DAILY
Qty: 1 TABLET | Refills: 0 | OUTPATIENT
Start: 2025-08-13

## 2025-08-14 ENCOUNTER — APPOINTMENT (OUTPATIENT)
Dept: CARDIOLOGY | Facility: CLINIC | Age: 85
End: 2025-08-14
Payer: MEDICARE

## 2025-09-11 ENCOUNTER — APPOINTMENT (OUTPATIENT)
Dept: CARDIOLOGY | Facility: CLINIC | Age: 85
End: 2025-09-11
Payer: MEDICARE